# Patient Record
Sex: MALE | Race: WHITE | NOT HISPANIC OR LATINO | Employment: FULL TIME | URBAN - NONMETROPOLITAN AREA
[De-identification: names, ages, dates, MRNs, and addresses within clinical notes are randomized per-mention and may not be internally consistent; named-entity substitution may affect disease eponyms.]

---

## 2023-09-26 PROBLEM — Z79.899 HIGH RISK MEDICATION USE: Status: ACTIVE | Noted: 2023-09-26

## 2023-09-26 PROBLEM — E78.5 HYPERLIPIDEMIA: Status: ACTIVE | Noted: 2023-09-26

## 2023-09-26 PROBLEM — Z98.61 HISTORY OF PTCA: Status: ACTIVE | Noted: 2023-09-26

## 2023-09-26 PROBLEM — Z95.0 PACEMAKER: Status: ACTIVE | Noted: 2023-09-26

## 2023-09-26 PROBLEM — I10 ESSENTIAL HYPERTENSION: Status: ACTIVE | Noted: 2023-09-26

## 2023-09-26 PROBLEM — Z86.79 H/O SICK SINUS SYNDROME: Status: ACTIVE | Noted: 2023-09-26

## 2023-09-26 PROBLEM — I48.19 PERSISTENT ATRIAL FIBRILLATION (MULTI): Status: ACTIVE | Noted: 2023-09-26

## 2023-09-26 PROBLEM — R60.0 LOCALIZED EDEMA: Status: ACTIVE | Noted: 2023-09-26

## 2023-09-26 PROBLEM — I25.10 2-VESSEL CORONARY ARTERY DISEASE: Status: ACTIVE | Noted: 2023-09-26

## 2023-09-26 PROBLEM — I25.2 HISTORY OF MI (MYOCARDIAL INFARCTION): Status: ACTIVE | Noted: 2023-09-26

## 2023-09-26 RX ORDER — SPIRONOLACTONE 25 MG/1
12.5 TABLET ORAL ONCE
COMMUNITY
Start: 2022-06-08 | End: 2024-03-15

## 2023-09-26 RX ORDER — DABIGATRAN ETEXILATE MESYLATE 150 MG/1
1 CAPSULE ORAL 2 TIMES DAILY
COMMUNITY
Start: 2021-03-25 | End: 2024-04-24 | Stop reason: SDUPTHER

## 2023-09-26 RX ORDER — HYDROCHLOROTHIAZIDE 12.5 MG/1
1 TABLET ORAL DAILY
COMMUNITY
Start: 2020-12-09 | End: 2023-12-19

## 2023-09-26 RX ORDER — NITROGLYCERIN 0.4 MG/1
0.4 TABLET SUBLINGUAL EVERY 5 MIN PRN
COMMUNITY

## 2023-09-26 RX ORDER — IRBESARTAN 300 MG/1
1 TABLET ORAL DAILY
COMMUNITY
Start: 2021-08-17

## 2023-09-26 RX ORDER — ATORVASTATIN CALCIUM 10 MG/1
1 TABLET, FILM COATED ORAL NIGHTLY
COMMUNITY
Start: 2021-07-30

## 2023-09-26 RX ORDER — METOPROLOL SUCCINATE 25 MG/1
1 TABLET, EXTENDED RELEASE ORAL DAILY
COMMUNITY
Start: 2021-11-17 | End: 2024-04-10 | Stop reason: SDUPTHER

## 2023-09-26 RX ORDER — MELOXICAM 15 MG/1
1 TABLET ORAL DAILY
COMMUNITY
Start: 2021-03-19

## 2023-12-15 DIAGNOSIS — I10 ESSENTIAL HYPERTENSION: ICD-10-CM

## 2023-12-15 DIAGNOSIS — Z98.61 HISTORY OF PTCA: ICD-10-CM

## 2023-12-15 DIAGNOSIS — I25.10 2-VESSEL CORONARY ARTERY DISEASE: ICD-10-CM

## 2023-12-19 RX ORDER — HYDROCHLOROTHIAZIDE 12.5 MG/1
12.5 TABLET ORAL DAILY
Qty: 90 TABLET | Refills: 3 | Status: SHIPPED | OUTPATIENT
Start: 2023-12-19 | End: 2023-12-28 | Stop reason: SDUPTHER

## 2023-12-28 DIAGNOSIS — I10 ESSENTIAL HYPERTENSION: ICD-10-CM

## 2023-12-28 DIAGNOSIS — I25.10 2-VESSEL CORONARY ARTERY DISEASE: ICD-10-CM

## 2023-12-28 DIAGNOSIS — Z98.61 HISTORY OF PTCA: ICD-10-CM

## 2023-12-29 RX ORDER — HYDROCHLOROTHIAZIDE 12.5 MG/1
12.5 TABLET ORAL DAILY
Qty: 90 TABLET | Refills: 3 | Status: SHIPPED | OUTPATIENT
Start: 2023-12-29 | End: 2024-01-22

## 2024-01-18 DIAGNOSIS — I10 ESSENTIAL HYPERTENSION: ICD-10-CM

## 2024-01-18 DIAGNOSIS — I25.10 2-VESSEL CORONARY ARTERY DISEASE: ICD-10-CM

## 2024-01-18 DIAGNOSIS — Z98.61 HISTORY OF PTCA: ICD-10-CM

## 2024-01-22 RX ORDER — HYDROCHLOROTHIAZIDE 12.5 MG/1
12.5 TABLET ORAL DAILY
Qty: 90 TABLET | Refills: 3 | Status: SHIPPED | OUTPATIENT
Start: 2024-01-22

## 2024-01-29 ENCOUNTER — OFFICE VISIT (OUTPATIENT)
Dept: CARDIOLOGY | Facility: CLINIC | Age: 79
End: 2024-01-29
Payer: MEDICARE

## 2024-01-29 VITALS
SYSTOLIC BLOOD PRESSURE: 128 MMHG | HEIGHT: 66 IN | BODY MASS INDEX: 31.66 KG/M2 | HEART RATE: 66 BPM | DIASTOLIC BLOOD PRESSURE: 68 MMHG | WEIGHT: 197 LBS

## 2024-01-29 DIAGNOSIS — E78.2 MIXED HYPERLIPIDEMIA: ICD-10-CM

## 2024-01-29 DIAGNOSIS — I25.10 2-VESSEL CORONARY ARTERY DISEASE: ICD-10-CM

## 2024-01-29 DIAGNOSIS — I25.2 HISTORY OF MI (MYOCARDIAL INFARCTION): ICD-10-CM

## 2024-01-29 DIAGNOSIS — Z95.0 PACEMAKER: ICD-10-CM

## 2024-01-29 DIAGNOSIS — I48.19 PERSISTENT ATRIAL FIBRILLATION (MULTI): ICD-10-CM

## 2024-01-29 DIAGNOSIS — Z98.61 HISTORY OF PTCA: ICD-10-CM

## 2024-01-29 PROCEDURE — 1160F RVW MEDS BY RX/DR IN RCRD: CPT | Performed by: INTERNAL MEDICINE

## 2024-01-29 PROCEDURE — 3078F DIAST BP <80 MM HG: CPT | Performed by: INTERNAL MEDICINE

## 2024-01-29 PROCEDURE — 1159F MED LIST DOCD IN RCRD: CPT | Performed by: INTERNAL MEDICINE

## 2024-01-29 PROCEDURE — 99213 OFFICE O/P EST LOW 20 MIN: CPT | Performed by: INTERNAL MEDICINE

## 2024-01-29 PROCEDURE — 3074F SYST BP LT 130 MM HG: CPT | Performed by: INTERNAL MEDICINE

## 2024-01-29 PROCEDURE — 1036F TOBACCO NON-USER: CPT | Performed by: INTERNAL MEDICINE

## 2024-01-29 NOTE — PROGRESS NOTES
"Subjective   Jg Hill is a 78 y.o. male       Chief Complaint    Annual Exam          78-year-old gentleman returns for follow-up he is doing well he denies any cardiovascular events, symptoms, nitrate usage or hospitalizations, heart failure, edema or palpitations.    He has a remote history of acute coronary syndrome, two-vessel PCI to the RCA and LAD in 2009, subsequent development of paroxysmal atrial fibrillation/sick sinus syndrome with pacemaker implantation as well as generator replacement in 2019. He has underlying controlled hypertension, mild obesity, remains ambulatory with a Rollator due to significant osteoarthritis. He is hemodynamically stable without evidence lower extremity edema.  He remains on Pradaxa and appropriate secondary preventive therapies    Device report is reviewed, revealing evidence of chronic persistent atrial fibrillation with controlled ventricular response no evidence of tachyarrhythmias or ventricular arrhythmias    Recommendations: Continue current therapies follow-up in 1 year         Review of Systems   All other systems reviewed and are negative.         Visit Vitals  /68 (BP Location: Right arm, Patient Position: Sitting)   Pulse 66   Ht 1.676 m (5' 6\")   Wt 89.4 kg (197 lb)   BMI 31.80 kg/m²   Smoking Status Never   BSA 2.04 m²        Objective   Physical Exam  Constitutional:       Appearance: Normal appearance. He is normal weight.   HENT:      Nose: Nose normal.   Neck:      Vascular: No carotid bruit.   Cardiovascular:      Rate and Rhythm: Normal rate.      Pulses: Normal pulses.      Heart sounds: Normal heart sounds.   Pulmonary:      Effort: Pulmonary effort is normal.   Abdominal:      General: Bowel sounds are normal.      Palpations: Abdomen is soft.   Genitourinary:     Rectum: Normal.   Musculoskeletal:         General: Normal range of motion.      Cervical back: Normal range of motion.      Right lower leg: No edema.      Left lower leg: No edema. "   Skin:     General: Skin is warm and dry.   Neurological:      General: No focal deficit present.      Mental Status: He is alert.   Psychiatric:         Mood and Affect: Mood normal.         Behavior: Behavior normal.         Thought Content: Thought content normal.         Judgment: Judgment normal.         Current Medications    Current Outpatient Medications:     atorvastatin (Lipitor) 10 mg tablet, Take 1 tablet (10 mg) by mouth once daily at bedtime., Disp: , Rfl:     hydroCHLOROthiazide (HYDRODiuril) 12.5 mg tablet, take 1 tablet by mouth once daily, Disp: 90 tablet, Rfl: 3    irbesartan (Avapro) 300 mg tablet, Take 1 tablet (300 mg) by mouth once daily., Disp: , Rfl:     meloxicam (Mobic) 15 mg tablet, Take 1 tablet (15 mg) by mouth once daily. PRN, Disp: , Rfl:     metoprolol succinate XL (Toprol-XL) 25 mg 24 hr tablet, Take 1 tablet (25 mg) by mouth once daily., Disp: , Rfl:     nitroglycerin (Nitrostat) 0.4 mg SL tablet, Place 1 tablet (0.4 mg) under the tongue every 5 minutes if needed for chest pain., Disp: , Rfl:     Pradaxa 150 mg capsule, Take 1 capsule (150 mg) by mouth 2 times a day., Disp: , Rfl:     spironolactone (Aldactone) 25 mg tablet, Take 0.5 tablets (12.5 mg) by mouth 1 time., Disp: , Rfl:                  Scribe Attestation  By signing my name below, Jenn CONNER LPN   , [employer]   attest that this documentation has been prepared under the direction and in the presence of Shady Art DO.     Assessment/Plan   1. High risk medication use        2. 2-vessel coronary artery disease        3. History of MI (myocardial infarction)        4. Pacemaker        5. History of PTCA        6. Mixed hyperlipidemia        7. Persistent atrial fibrillation (CMS/HCC)

## 2024-02-08 LAB
NON-UH HIE ALANINE AMINOTRANSFERASE: 16 U/L (ref 7–52)
NON-UH HIE ANION GAP: 10.8 (ref 6–15)
NON-UH HIE ASPARTATE AMINO TRANSFERASE: 16 U/L (ref 13–39)
NON-UH HIE BLOOD UREA NITROGEN: 21 MG/DL (ref 7–25)
NON-UH HIE CALCIUM: 10.6 MG/DL (ref 8.6–10.3)
NON-UH HIE CARBON DIOXIDE: 32.5 MMOL/L (ref 21–31)
NON-UH HIE CHLORIDE: 102 MMOL/L (ref 98–107)
NON-UH HIE CHOL/HDL RATIO: 2.8
NON-UH HIE CHOLESTEROL: 146 MG/DL (ref 140–200)
NON-UH HIE CREATININE: 0.91 MG/DL (ref 0.7–1.3)
NON-UH HIE ESTIMATED GFR: > 60
NON-UH HIE GLUCOSE: 96 MG/DL (ref 70–100)
NON-UH HIE HDL CHOLESTEROL: 53 MG/DL (ref 23–92)
NON-UH HIE LDL CHOLESTEROL,CALCULATED: 74 MG/DL (ref 0–100)
NON-UH HIE POTASSIUM: 4.3 MMOL/L (ref 3.5–5.1)
NON-UH HIE SODIUM: 141 MMOL/L (ref 136–145)
NON-UH HIE TRIGLYCERIDE W/REFLEX: 97 MG/DL (ref 0–149)
NON-UH HIE VLDL CHOLESTEROL: 19 MG/DL

## 2024-02-14 ENCOUNTER — TELEPHONE (OUTPATIENT)
Dept: CARDIOLOGY | Facility: CLINIC | Age: 79
End: 2024-02-14
Payer: MEDICARE

## 2024-03-14 DIAGNOSIS — I25.2 HISTORY OF MI (MYOCARDIAL INFARCTION): ICD-10-CM

## 2024-03-14 DIAGNOSIS — I10 ESSENTIAL HYPERTENSION: ICD-10-CM

## 2024-03-15 RX ORDER — SPIRONOLACTONE 25 MG/1
TABLET ORAL DAILY
Qty: 45 TABLET | Refills: 3 | Status: SHIPPED | OUTPATIENT
Start: 2024-03-15

## 2024-03-28 LAB
NON-UH HIE ALANINE AMINOTRANSFERASE: 14 U/L (ref 7–52)
NON-UH HIE ANION GAP: 9.8 (ref 6–15)
NON-UH HIE ASPARTATE AMINO TRANSFERASE: 15 U/L (ref 13–39)
NON-UH HIE BLOOD UREA NITROGEN: 21 MG/DL (ref 7–25)
NON-UH HIE CALCIUM: 10.2 MG/DL (ref 8.6–10.3)
NON-UH HIE CARBON DIOXIDE: 31.3 MMOL/L (ref 21–31)
NON-UH HIE CHLORIDE: 104 MMOL/L (ref 98–107)
NON-UH HIE CHOL/HDL RATIO: 2.7
NON-UH HIE CHOLESTEROL: 138 MG/DL (ref 140–200)
NON-UH HIE CREATININE: 0.94 MG/DL (ref 0.7–1.3)
NON-UH HIE ESTIMATED GFR: > 60
NON-UH HIE GLUCOSE: 110 MG/DL (ref 70–100)
NON-UH HIE HDL CHOLESTEROL: 51 MG/DL (ref 23–92)
NON-UH HIE LDL CHOLESTEROL,CALCULATED: 72 MG/DL (ref 0–100)
NON-UH HIE POTASSIUM: 4.1 MMOL/L (ref 3.5–5.1)
NON-UH HIE SODIUM: 141 MMOL/L (ref 136–145)
NON-UH HIE TRIGLYCERIDE W/REFLEX: 77 MG/DL (ref 0–149)
NON-UH HIE VLDL CHOLESTEROL: 15 MG/DL

## 2024-04-10 DIAGNOSIS — I25.10 2-VESSEL CORONARY ARTERY DISEASE: ICD-10-CM

## 2024-04-10 DIAGNOSIS — I48.19 PERSISTENT ATRIAL FIBRILLATION (MULTI): ICD-10-CM

## 2024-04-10 DIAGNOSIS — I10 ESSENTIAL HYPERTENSION: ICD-10-CM

## 2024-04-10 RX ORDER — METOPROLOL SUCCINATE 25 MG/1
25 TABLET, EXTENDED RELEASE ORAL DAILY
Qty: 90 TABLET | Refills: 3 | Status: SHIPPED | OUTPATIENT
Start: 2024-04-10 | End: 2025-04-10

## 2024-04-24 DIAGNOSIS — I48.19 PERSISTENT ATRIAL FIBRILLATION (MULTI): ICD-10-CM

## 2024-04-24 DIAGNOSIS — Z98.61 HISTORY OF PTCA: ICD-10-CM

## 2024-04-24 DIAGNOSIS — I25.10 2-VESSEL CORONARY ARTERY DISEASE: ICD-10-CM

## 2024-04-29 RX ORDER — DABIGATRAN ETEXILATE 150 MG/1
150 CAPSULE ORAL 2 TIMES DAILY
Qty: 180 CAPSULE | Refills: 3 | Status: SHIPPED | OUTPATIENT
Start: 2024-04-29

## 2024-06-13 DIAGNOSIS — I25.2 HISTORY OF MI (MYOCARDIAL INFARCTION): ICD-10-CM

## 2024-06-13 DIAGNOSIS — I10 ESSENTIAL HYPERTENSION: ICD-10-CM

## 2024-06-14 RX ORDER — SPIRONOLACTONE 25 MG/1
12.5 TABLET ORAL DAILY
Qty: 45 TABLET | Refills: 3 | Status: SHIPPED | OUTPATIENT
Start: 2024-06-14 | End: 2025-06-14

## 2024-06-27 ENCOUNTER — TELEPHONE (OUTPATIENT)
Dept: CARDIOLOGY | Facility: CLINIC | Age: 79
End: 2024-06-27
Payer: MEDICARE

## 2024-07-18 PROCEDURE — 93280 PM DEVICE PROGR EVAL DUAL: CPT | Performed by: INTERNAL MEDICINE

## 2024-10-02 DIAGNOSIS — E78.5 HYPERLIPIDEMIA, UNSPECIFIED HYPERLIPIDEMIA TYPE: ICD-10-CM

## 2024-10-07 RX ORDER — ATORVASTATIN CALCIUM 10 MG/1
10 TABLET, FILM COATED ORAL NIGHTLY
Qty: 90 TABLET | Refills: 3 | Status: SHIPPED | OUTPATIENT
Start: 2024-10-07 | End: 2025-10-07

## 2024-12-17 ENCOUNTER — TELEPHONE (OUTPATIENT)
Dept: CARDIOLOGY | Facility: CLINIC | Age: 79
End: 2024-12-17
Payer: MEDICARE

## 2024-12-17 NOTE — TELEPHONE ENCOUNTER
Wife phones in with concerns related to patient's mediations.     She states she went to refill spironolactone and pharmacist asked her if patient is taking potassium, he is not currently on a potassium supplement. Pt had a fall and was in [facility] last nights, 12/16, and his potassium level came back at 3.     Wife is wondering if patient needs to be taking a potassium supplement. Please advise     Will need sent to [facility] in [place] if so.

## 2024-12-17 NOTE — TELEPHONE ENCOUNTER
Spoke with patient's spouse and informed her that spironolactone does not cause potassium to decrease, rather it spares potassium. Informed her Dr. Shady Art, DO states patient could consider stopping hydrochlorothiazide. She verbalized understanding and states she is going to have patient stop hydrochlorothiazide.

## 2025-02-04 ENCOUNTER — APPOINTMENT (OUTPATIENT)
Dept: CARDIOLOGY | Facility: CLINIC | Age: 80
End: 2025-02-04
Payer: MEDICARE

## 2025-02-04 VITALS
HEIGHT: 66 IN | WEIGHT: 190 LBS | HEART RATE: 56 BPM | BODY MASS INDEX: 30.53 KG/M2 | DIASTOLIC BLOOD PRESSURE: 60 MMHG | SYSTOLIC BLOOD PRESSURE: 106 MMHG

## 2025-02-04 DIAGNOSIS — Z95.0 PACEMAKER: ICD-10-CM

## 2025-02-04 DIAGNOSIS — E78.5 HYPERLIPIDEMIA, UNSPECIFIED HYPERLIPIDEMIA TYPE: ICD-10-CM

## 2025-02-04 DIAGNOSIS — I48.19 PERSISTENT ATRIAL FIBRILLATION (MULTI): ICD-10-CM

## 2025-02-04 DIAGNOSIS — I65.23 CAROTID ARTERY PLAQUE, BILATERAL: ICD-10-CM

## 2025-02-04 DIAGNOSIS — I25.10 ASHD (ARTERIOSCLEROTIC HEART DISEASE): ICD-10-CM

## 2025-02-04 DIAGNOSIS — I10 ESSENTIAL HYPERTENSION: ICD-10-CM

## 2025-02-04 DIAGNOSIS — Z86.79 H/O SICK SINUS SYNDROME: ICD-10-CM

## 2025-02-04 DIAGNOSIS — R93.89 ABNORMAL CT SCAN: ICD-10-CM

## 2025-02-04 DIAGNOSIS — Z98.61 HISTORY OF PTCA: ICD-10-CM

## 2025-02-04 DIAGNOSIS — Z87.891 FORMER SMOKER: ICD-10-CM

## 2025-02-04 PROCEDURE — 99214 OFFICE O/P EST MOD 30 MIN: CPT | Performed by: INTERNAL MEDICINE

## 2025-02-04 PROCEDURE — 3074F SYST BP LT 130 MM HG: CPT | Performed by: INTERNAL MEDICINE

## 2025-02-04 PROCEDURE — 1160F RVW MEDS BY RX/DR IN RCRD: CPT | Performed by: INTERNAL MEDICINE

## 2025-02-04 PROCEDURE — 3078F DIAST BP <80 MM HG: CPT | Performed by: INTERNAL MEDICINE

## 2025-02-04 PROCEDURE — 1036F TOBACCO NON-USER: CPT | Performed by: INTERNAL MEDICINE

## 2025-02-04 PROCEDURE — 1159F MED LIST DOCD IN RCRD: CPT | Performed by: INTERNAL MEDICINE

## 2025-02-04 RX ORDER — IRBESARTAN 150 MG/1
150 TABLET ORAL NIGHTLY
Qty: 90 TABLET | Refills: 3 | Status: SHIPPED | OUTPATIENT
Start: 2025-02-04 | End: 2026-02-04

## 2025-02-04 RX ORDER — DABIGATRAN ETEXILATE 150 MG/1
150 CAPSULE ORAL 2 TIMES DAILY
Qty: 180 CAPSULE | Refills: 3 | Status: SHIPPED | OUTPATIENT
Start: 2025-02-04

## 2025-02-04 RX ORDER — SPIRONOLACTONE 25 MG/1
12.5 TABLET ORAL DAILY
Qty: 45 TABLET | Refills: 3 | Status: SHIPPED | OUTPATIENT
Start: 2025-02-04 | End: 2026-02-04

## 2025-02-04 RX ORDER — HYDROCHLOROTHIAZIDE 12.5 MG/1
12.5 TABLET ORAL
COMMUNITY
Start: 2024-01-22 | End: 2025-02-04 | Stop reason: SDUPTHER

## 2025-02-04 RX ORDER — HYDROCHLOROTHIAZIDE 12.5 MG/1
12.5 TABLET ORAL
Qty: 90 TABLET | Refills: 3 | Status: SHIPPED | OUTPATIENT
Start: 2025-02-04 | End: 2026-02-04

## 2025-02-04 RX ORDER — METOPROLOL SUCCINATE 25 MG/1
25 TABLET, EXTENDED RELEASE ORAL DAILY
Qty: 90 TABLET | Refills: 3 | Status: SHIPPED | OUTPATIENT
Start: 2025-02-04 | End: 2026-02-04

## 2025-02-04 RX ORDER — ATORVASTATIN CALCIUM 10 MG/1
10 TABLET, FILM COATED ORAL NIGHTLY
Qty: 90 TABLET | Refills: 3 | Status: SHIPPED | OUTPATIENT
Start: 2025-02-04 | End: 2026-02-04

## 2025-02-04 NOTE — PROGRESS NOTES
"Subjective   Jg Hill is a 79 y.o. male       Chief Complaint    Annual Exam          79-year-old gentleman returns for annual follow-up he is doing well he denies any cardiovascular events, complaints or nitrate usage or cardiovascular hospitalizations, heart failure, edema or true syncope.  He did fall (mechanical fall) this past December 17, sustaining laceration to the occiput with bleeding that was stapled, with no residual problems or sequela or neurologic issues.    On CT imaging of the head and brain there is no intracranial trauma; there is mention of carotid bifurcation calcium and plaque (no mention of stenosis), and transverse aorta was normal caliber without aneurysm.  Clinically, he denies any neurologic events he has never had any carotid ultrasound imaging for surveillance.  He remains on Pradaxa and treatment for thromboembolic prophylaxis for paroxysmal A-fib.  He did have stress perfusion imaging in 2019, 10 years after his original revascularization that was completely normal    He has a remote history of acute coronary syndrome, two-vessel PCI to the RCA and LAD in 2009, subsequent development of paroxysmal atrial fibrillation/sick sinus syndrome with pacemaker implantation as well as generator replacement in 2019. He has underlying controlled hypertension, mild obesity, remains ambulatory with a Rollator due to significant osteoarthritis.    Based on the above I would continue to recommend continue current therapies, counseling on fall prevention, will proceed with elective carotid duplex imaging based on the recent CT results simply to gain a baseline for his carotid arteries as he has never had any surveillance         Review of Systems   All other systems reviewed and are negative.           Vitals:    02/04/25 1326   BP: 106/60   BP Location: Right arm   Patient Position: Sitting   Pulse: 56   Weight: 86.2 kg (190 lb)   Height: 1.676 m (5' 6\")        Objective   Physical " Exam  Constitutional:       Appearance: Normal appearance.   HENT:      Nose: Nose normal.   Neck:      Vascular: No carotid bruit.   Cardiovascular:      Rate and Rhythm: Normal rate.      Pulses: Normal pulses.      Heart sounds: Normal heart sounds.   Pulmonary:      Effort: Pulmonary effort is normal.   Abdominal:      General: Bowel sounds are normal.      Palpations: Abdomen is soft.   Musculoskeletal:         General: Normal range of motion.      Cervical back: Normal range of motion.      Right lower leg: No edema.      Left lower leg: No edema.   Skin:     General: Skin is warm and dry.   Neurological:      General: No focal deficit present.      Mental Status: He is alert.   Psychiatric:         Mood and Affect: Mood normal.         Behavior: Behavior normal.         Thought Content: Thought content normal.         Judgment: Judgment normal.         Allergies  Hydrocodone-acetaminophen and Tramadol     Current Medications    Current Outpatient Medications:     atorvastatin (Lipitor) 10 mg tablet, Take 1 tablet (10 mg) by mouth once daily at bedtime., Disp: 90 tablet, Rfl: 3    dabigatran etexilate (Pradaxa) 150 mg capsule, Take 1 capsule (150 mg) by mouth 2 times a day., Disp: 180 capsule, Rfl: 3    hydroCHLOROthiazide (Microzide) 12.5 mg tablet, Take 1 tablet (12.5 mg) by mouth once daily., Disp: , Rfl:     irbesartan (Avapro) 150 mg tablet, Take 1 tablet (150 mg) by mouth once daily at bedtime., Disp: 90 tablet, Rfl: 3    meloxicam (Mobic) 15 mg tablet, Take 1 tablet (15 mg) by mouth once daily. PRN, Disp: , Rfl:     metoprolol succinate XL (Toprol-XL) 25 mg 24 hr tablet, Take 1 tablet (25 mg) by mouth once daily., Disp: 90 tablet, Rfl: 3    nitroglycerin (Nitrostat) 0.4 mg SL tablet, Place 1 tablet (0.4 mg) under the tongue every 5 minutes if needed for chest pain., Disp: , Rfl:     spironolactone (Aldactone) 25 mg tablet, Take 0.5 tablets (12.5 mg) by mouth once daily., Disp: 45 tablet, Rfl: 3                      Assessment/Plan   1. ASHD (arteriosclerotic heart disease)        2. History of PTCA  Follow Up In Cardiology      3. Persistent atrial fibrillation (Multi)        4. H/O sick sinus syndrome        5. Abnormal CT scan        6. Pacemaker  Follow Up In Cardiology      7. Essential hypertension        8. Hyperlipidemia, unspecified hyperlipidemia type        9. BMI 30.0-30.9,adult        10. Former smoker                 Scribe Attestation  By signing my name below, ICatrachita LPN  , Scribe   attest that this documentation has been prepared under the direction and in the presence of Shady Art DO.     Provider Attestation - Scribe documentation    All medical record entries made by the Scribe were at my direction and personally dictated by me. I have reviewed the chart and agree that the record accurately reflects my personal performance of the history, physical exam, discussion and plan.

## 2025-02-04 NOTE — LETTER
February 4, 2025     Randal Glez MD  [address] [facility] [address]    Patient: Jg Hill   YOB: 1945   Date of Visit: 2/4/2025       Dear Dr. Randal Glez MD:    Thank you for referring Jg Hill to me for evaluation. Below are my notes for this consultation.  If you have questions, please do not hesitate to call me. I look forward to following your patient along with you.       Sincerely,     Shady Art, DO      CC: No Recipients  ______________________________________________________________________________________    Subjective   Jg Hill is a 79 y.o. male       Chief Complaint    Annual Exam          79-year-old gentleman returns for annual follow-up he is doing well he denies any cardiovascular events, complaints or nitrate usage or cardiovascular hospitalizations, heart failure, edema or true syncope.  He did fall (mechanical fall) this past December 17, sustaining laceration to the occiput with bleeding that was stapled, with no residual problems or sequela or neurologic issues.    On CT imaging of the head and brain there is no intracranial trauma; there is mention of carotid bifurcation calcium and plaque (no mention of stenosis), and transverse aorta was normal caliber without aneurysm.  Clinically, he denies any neurologic events he has never had any carotid ultrasound imaging for surveillance.  He remains on Pradaxa and treatment for thromboembolic prophylaxis for paroxysmal A-fib.  He did have stress perfusion imaging in 2019, 10 years after his original revascularization that was completely normal    He has a remote history of acute coronary syndrome, two-vessel PCI to the RCA and LAD in 2009, subsequent development of paroxysmal atrial fibrillation/sick sinus syndrome with pacemaker implantation as well as generator replacement in 2019. He has underlying controlled hypertension, mild obesity, remains ambulatory with  "a Rollator due to significant osteoarthritis.    Based on the above I would continue to recommend continue current therapies, counseling on fall prevention, will proceed with elective carotid duplex imaging based on the recent CT results simply to gain a baseline for his carotid arteries as he has never had any surveillance         Review of Systems   All other systems reviewed and are negative.           Vitals:    02/04/25 1326   BP: 106/60   BP Location: Right arm   Patient Position: Sitting   Pulse: 56   Weight: 86.2 kg (190 lb)   Height: 1.676 m (5' 6\")        Objective   Physical Exam  Constitutional:       Appearance: Normal appearance.   HENT:      Nose: Nose normal.   Neck:      Vascular: No carotid bruit.   Cardiovascular:      Rate and Rhythm: Normal rate.      Pulses: Normal pulses.      Heart sounds: Normal heart sounds.   Pulmonary:      Effort: Pulmonary effort is normal.   Abdominal:      General: Bowel sounds are normal.      Palpations: Abdomen is soft.   Musculoskeletal:         General: Normal range of motion.      Cervical back: Normal range of motion.      Right lower leg: No edema.      Left lower leg: No edema.   Skin:     General: Skin is warm and dry.   Neurological:      General: No focal deficit present.      Mental Status: He is alert.   Psychiatric:         Mood and Affect: Mood normal.         Behavior: Behavior normal.         Thought Content: Thought content normal.         Judgment: Judgment normal.         Allergies  Hydrocodone-acetaminophen and Tramadol     Current Medications    Current Outpatient Medications:   •  atorvastatin (Lipitor) 10 mg tablet, Take 1 tablet (10 mg) by mouth once daily at bedtime., Disp: 90 tablet, Rfl: 3  •  dabigatran etexilate (Pradaxa) 150 mg capsule, Take 1 capsule (150 mg) by mouth 2 times a day., Disp: 180 capsule, Rfl: 3  •  hydroCHLOROthiazide (Microzide) 12.5 mg tablet, Take 1 tablet (12.5 mg) by mouth once daily., Disp: , Rfl:   •  irbesartan " (Avapro) 150 mg tablet, Take 1 tablet (150 mg) by mouth once daily at bedtime., Disp: 90 tablet, Rfl: 3  •  meloxicam (Mobic) 15 mg tablet, Take 1 tablet (15 mg) by mouth once daily. PRN, Disp: , Rfl:   •  metoprolol succinate XL (Toprol-XL) 25 mg 24 hr tablet, Take 1 tablet (25 mg) by mouth once daily., Disp: 90 tablet, Rfl: 3  •  nitroglycerin (Nitrostat) 0.4 mg SL tablet, Place 1 tablet (0.4 mg) under the tongue every 5 minutes if needed for chest pain., Disp: , Rfl:   •  spironolactone (Aldactone) 25 mg tablet, Take 0.5 tablets (12.5 mg) by mouth once daily., Disp: 45 tablet, Rfl: 3                     Assessment/Plan   1. ASHD (arteriosclerotic heart disease)        2. History of PTCA  Follow Up In Cardiology      3. Persistent atrial fibrillation (Multi)        4. H/O sick sinus syndrome        5. Abnormal CT scan        6. Pacemaker  Follow Up In Cardiology      7. Essential hypertension        8. Hyperlipidemia, unspecified hyperlipidemia type        9. BMI 30.0-30.9,adult        10. Former smoker                 Scribe Attestation  By signing my name below, ICatrachita LPN, Scribe   attest that this documentation has been prepared under the direction and in the presence of Shady Art DO.     Provider Attestation - Scribe documentation    All medical record entries made by the Scribe were at my direction and personally dictated by me. I have reviewed the chart and agree that the record accurately reflects my personal performance of the history, physical exam, discussion and plan.

## 2025-02-04 NOTE — PATIENT INSTRUCTIONS
Please bring all medicines, vitamins, and herbal supplements with you when you come to the office.    Prescriptions will not be filled unless you are compliant with your follow up appointments or have a follow up appointment scheduled as per instruction of your physician. Refills should be requested at the time of your visit.     Fall Prevention Education Given   BMI was above normal measurement. Current weight: 86.2 kg (190 lb)  Weight change since last visit (-) denotes wt loss -7 lbs   Weight loss needed to achieve BMI 25: 35.4 Lbs  Weight loss needed to achieve BMI 30: 4.5 Lbs  Provided instructions on dietary changes  Provided instructions on exercise.

## 2025-03-31 ENCOUNTER — HOSPITAL ENCOUNTER (OUTPATIENT)
Dept: CARDIOLOGY | Facility: CLINIC | Age: 80
Discharge: HOME | End: 2025-03-31
Payer: MEDICARE

## 2025-03-31 DIAGNOSIS — I65.23 CAROTID ARTERY PLAQUE, BILATERAL: ICD-10-CM

## 2025-03-31 DIAGNOSIS — Z98.61 HISTORY OF PTCA: ICD-10-CM

## 2025-03-31 DIAGNOSIS — I25.10 ASHD (ARTERIOSCLEROTIC HEART DISEASE): ICD-10-CM

## 2025-03-31 DIAGNOSIS — R93.89 ABNORMAL CT SCAN: ICD-10-CM

## 2025-03-31 PROCEDURE — 93880 EXTRACRANIAL BILAT STUDY: CPT | Performed by: INTERNAL MEDICINE

## 2025-03-31 PROCEDURE — 93880 EXTRACRANIAL BILAT STUDY: CPT

## 2025-04-02 ENCOUNTER — TELEPHONE (OUTPATIENT)
Dept: CARDIOLOGY | Facility: CLINIC | Age: 80
End: 2025-04-02
Payer: MEDICARE

## 2025-04-04 ENCOUNTER — TELEPHONE (OUTPATIENT)
Dept: CARDIOLOGY | Facility: CLINIC | Age: 80
End: 2025-04-04
Payer: MEDICARE

## 2025-04-04 NOTE — TELEPHONE ENCOUNTER
Result Communication    Resulted Orders   Vascular US Carotid Artery Duplex Bilateral    Narrative                   [facility]  [address], [address], [place] [address]          Tel [phone] Fax [phone]       Vascular Lab Report     [facility] US CAROTID ARTERY DUPLEX BILATERAL    Patient Name:      MONA L[address] TORRES Medina Physician:  [id number] Christi Velasquez MD, [facility]  Study Date:        3/31/2025            Ordering Provider:  [id number] JACEK POLO  MRN/PID:           [id number]             Fellow:  Accession#:        [id number]         Technologist:       Nicole Calvert [facility],                                                              T  Date of Birth/Age: 1945 / 79 years Technologist 2:  Gender:            M                    Encounter#:         [id number]  Admission Status:  Outpatient           Location Performed: [facility]       Diagnosis/ICD: 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 Laminar flow seen by color Doppler. Right external carotid artery appears patent with no evidence of stenosis. No evidence of hemodynamically significant stenosis of the right common carotid artery. The right vertebral artery is patent with antegrade flow. No changes since 2019.  Left Carotid: Findings are consistent with less than 50% stenosis of the left proximal internal carotid artery. Laminar flow seen by color Doppler. Left external carotid artery appears patent with no evidence of stenosis. No  evidence of hemodynamically significant stenosis of the left common carotid artery. The left vertebral artery is patent with antegrade flow. No changes since 2019.     Imaging & Doppler Findings:  Right Plaque Morph: The proximal right internal carotid artery demonstrates irregular and calcified plaque. The mid right internal carotid artery demonstrates irregular and calcified plaque. The proximal right external carotid artery demonstrates irregular and calcified plaque. The distal right common carotid artery demonstrates irregular plaque.  Left Plaque Morph: The proximal left internal carotid artery demonstrates irregular plaque. The mid left internal carotid artery demonstrates irregular plaque. The proximal left external carotid artery demonstrates irregular plaque. The distal left common carotid artery demonstrates irregular plaque.      Right                      Left    PSV      EDV               PSV     EDV  63 cm/s  13 cm/s   CCA P   84 cm/s 20 cm/s  72 cm/s  19 cm/s   CCA M   64 cm/s 16 cm/s  67 cm/s  17 cm/s   CCA D   60 cm/s 11 cm/s  35 cm/s  14 cm/s   ICA P   76 cm/s 24 cm/s  54 cm/s  18 cm/s   ICA M   81 cm/s 31 cm/s  72 cm/s  24 cm/s   ICA D   67 cm/s 22 cm/s  109 cm/s 95 cm/s    ECA    67 cm/s 51 cm/s  45 cm/s  31 cm/s Vertebral 55 cm/s 39 cm/s                     Right Left  ICA/CCA Ratio  0.5  1.3          [id number] Christi Velasquez MD, FACC  Electronically signed by [id number] Christi Velasquez MD, FACC on 4/2/2025 at 5:15:34 PM         ** Final **

## 2025-04-04 NOTE — TELEPHONE ENCOUNTER
----- Message from Shady Art sent at 4/3/2025  5:25 PM EDT -----  No new orders. Please call patient with normal result.

## 2025-05-14 ENCOUNTER — TELEPHONE (OUTPATIENT)
Dept: CARDIOLOGY | Facility: CLINIC | Age: 80
End: 2025-05-14
Payer: MEDICARE

## 2025-05-14 DIAGNOSIS — R60.9 EDEMA, UNSPECIFIED TYPE: ICD-10-CM

## 2025-05-14 NOTE — TELEPHONE ENCOUNTER
Wife called to report narciso lower extremity edema, left greater than right.  Edema is from ankle to knee area. Denies redness or warmth. Denies fever. Denies any more discomfort than normal. Wife gave an extra hydrochlorothiazide and mild improvement noted.  States edema increased as day passes. Onset-2 weeks.     Wife states patient normally has intermittent edema but in the past 2 weeks then edema has not resolved. Patient is able to wear shoes.  Inquired if hydrochlorothiazide can be increased?   Patient does have hx of knee injury left side.     Patient is taking Praxdaxa.

## 2025-05-15 RX ORDER — FUROSEMIDE 20 MG/1
20 TABLET ORAL AS NEEDED
Qty: 15 TABLET | Refills: 0 | Status: SHIPPED | OUTPATIENT
Start: 2025-05-15

## 2025-05-15 NOTE — TELEPHONE ENCOUNTER
Patient went to ER last night. Concerned was a possible DVT and the amount of edema. States testing was neg and was dx with chronic edema.     Recommendations given.  Would like rx for lasix. To Beatriz Macdonald NP to sign

## 2025-05-16 NOTE — TELEPHONE ENCOUNTER
Patient's spouse phoned left message on nursing line inquiring if pt would need potassium with extra Lasix prescribed. Attempted  to phone, detailed message left. TO [facility] for follow up.    Patient phoned orders discussed Lasix PRN, will follow up as scheduled. Verbalized understanding.

## 2025-05-21 ENCOUNTER — APPOINTMENT (OUTPATIENT)
Dept: CARDIOLOGY | Facility: CLINIC | Age: 80
End: 2025-05-21
Payer: MEDICARE

## 2025-05-21 VITALS
HEART RATE: 56 BPM | SYSTOLIC BLOOD PRESSURE: 120 MMHG | WEIGHT: 201 LBS | HEIGHT: 66 IN | BODY MASS INDEX: 32.3 KG/M2 | DIASTOLIC BLOOD PRESSURE: 70 MMHG

## 2025-05-21 DIAGNOSIS — R60.0 LOCALIZED EDEMA: Primary | ICD-10-CM

## 2025-05-21 PROBLEM — I48.19 PERSISTENT ATRIAL FIBRILLATION (MULTI): Status: RESOLVED | Noted: 2023-09-26 | Resolved: 2025-05-21

## 2025-05-21 PROCEDURE — 1159F MED LIST DOCD IN RCRD: CPT | Performed by: NURSE PRACTITIONER

## 2025-05-21 PROCEDURE — 99214 OFFICE O/P EST MOD 30 MIN: CPT | Performed by: NURSE PRACTITIONER

## 2025-05-21 PROCEDURE — 1160F RVW MEDS BY RX/DR IN RCRD: CPT | Performed by: NURSE PRACTITIONER

## 2025-05-21 PROCEDURE — 3074F SYST BP LT 130 MM HG: CPT | Performed by: NURSE PRACTITIONER

## 2025-05-21 PROCEDURE — 3078F DIAST BP <80 MM HG: CPT | Performed by: NURSE PRACTITIONER

## 2025-05-21 NOTE — LETTER
"May 22, 2025     Randal Glez MD  [address] [facility] [address]    Patient: Jg Hill   YOB: 1945   Date of Visit: 5/21/2025       Dear Dr. Randal Glez MD:    Thank you for referring Jg Hill to me for evaluation. Below are my notes for this consultation.  If you have questions, please do not hesitate to call me. I look forward to following your patient along with you.       Sincerely,     Beatriz Bashir, APRN-CNP      CC: No Recipients  ______________________________________________________________________________________    Chief Complaint  \"My legs have been swelling\"    Reason for Visit  Add-on  Patient presents to the office today for outpatient follow-up for edema.  Last evaluated in clinic by Dr. Art Feb 2025.    Presents today ambulatory with steady gait.  Accompanied by spouse  Patient denies any hospitalizations or significant changes to interval medical history since last office follow-up.     History of Present Illness   Patient wife did phone into the office reporting increasing left greater than right edema.  He was seen in local emergency department where he ruled out for DVT, Keflex was added due to right lower extremity with early signs of cellulitis from an open wound.  I have then added Lasix 20 mg as needed and they have taken 2 doses with noted benefit.    Patient reports somewhat chronic left edema likely due to venous stasis changes.  He states that things have really been okay over the last 6 months but seem to have\" flared up over the last couple weeks\".  He did have an injury to his right shin with an open area that is scabbed over, there are some slight erythema but they report with Keflex treatment things are getting better.  He remains with 1+ Tolin edema on the right, that left ankle looks to have some chronic venous stasis nonpitting changes.  He denies any orthopnea, there is no PND.  He sleeps in recliner " "for comfort.  He does not elevate his legs during the day.  This is his busy season at the [employer] and he is working 7 days a week.  His weight in the office is up 11 pounds he was wearing his jacket, he believes this is an accurate and reports his weight is steady on his home scale.    Potassium 3.9, creatinine 0.93.    Patient reports that overall has no complaint(s) of chest pain, chest pressure/discomfort, claudication, dyspnea, exertional chest pressure/discomfort, fatigue, irregular heart beat, and near-syncope    Review of Systems   Cardiovascular:  Positive for leg swelling. Negative for chest pain, dyspnea on exertion, irregular heartbeat, near-syncope, orthopnea, palpitations, paroxysmal nocturnal dyspnea and syncope.        Visit Vitals  /70 (BP Location: Left arm, Patient Position: Sitting)   Pulse 56   Ht 1.676 m (5' 6\")   Wt 91.2 kg (201 lb)   BMI 32.44 kg/m²   Smoking Status Never   BSA 2.06 m²     Physical Exam  Vitals and nursing note reviewed.   Constitutional:       Appearance: Normal appearance.   Cardiovascular:      Rate and Rhythm: Normal rate and regular rhythm.      Heart sounds: Murmur heard.      Systolic murmur is present with a grade of 2/6.      Comments: Left ankle nonpitting edema chronic venous stasis  Right shin with scabbed over open area, no drainage.  Mild erythema.  1+ edema.  Currently on Keflex.  Pulmonary:      Effort: Pulmonary effort is normal.      Breath sounds: Normal breath sounds.   Musculoskeletal:      Cervical back: Full passive range of motion without pain.      Right lower leg: No edema.      Left lower leg: No edema.   Skin:     General: Skin is cool.   Neurological:      Mental Status: He is alert and oriented to person, place, and time.   Psychiatric:         Attention and Perception: Attention normal.         Mood and Affect: Mood normal.         Behavior: Behavior is cooperative.          ALLERGIES:  Hydrocodone-acetaminophen and Tramadol    Current " Outpatient Medications   Medication Instructions   • atorvastatin (LIPITOR) 10 mg, oral, Nightly   • dabigatran etexilate (PRADAXA) 150 mg, oral, 2 times daily   • furosemide (LASIX) 20 mg, oral, As needed   • hydroCHLOROthiazide (MICROZIDE) 12.5 mg, oral, Daily RT   • irbesartan (AVAPRO) 150 mg, oral, Nightly   • meloxicam (Mobic) 15 mg tablet 1 tablet, Daily   • metoprolol succinate XL (TOPROL-XL) 25 mg, oral, Daily   • nitroglycerin (NITROSTAT) 0.4 mg, Every 5 min PRN   • spironolactone (ALDACTONE) 12.5 mg, oral, Daily      Assessment:    79-year-old gentleman presents to the office due to concerns of progressive bilateral lower extremity edema.  Left ankle seems to be chronic, right lower extremity edema is likely due to recent injury and open wound, improving cellulitis.  Will continue Keflex and has been encouraged to update with PCP.  He is on no offending agents.  Encouraged to keep legs elevated is much as possible.  Otherwise, we will continue Lasix on an as-needed basis.  Discussed that only concern would be with progression to chronic atrial fibrillation with high percent of ventricular pacing there could be some evidence of cardiomyopathy.  Last TTE in 2019 and at that time he was in normal sinus rhythm.  He does agree to repeat echocardiogram for surveillance and to clinic for optimization of guideline directed medical therapy of any evidence of cardiomyopathy.    From a cardiovascular history:  Remote 2009 PCI, last ischemic evaluation 2019 MPI.  Current daily activity greater than 4 METS without concerning symptoms.  2019 TTE LVEF 55 to 60%, LVH mild, left atrium moderate.  MR trace.  [facility] 2278 dual-chamber permanent pacemaker.  Chronic atrial fibrillation with January 2025 device interrogation showing V pacing 87%.  CHADS VASc 6 chronically anticoagulated Pradaxa.  Denies bleeding diatheses.    Plan:     Through informed decision making process incorporating patients unique  circumstances, the following treatment plan will be initiated:    1.  Prescription drug management of cardiovascular medication for efficacy, adherence to treatment, side effect assessment and polypharmacy. Current treatment clinically warranted and to continue without modifications.    2.  Echocardiogram (edema, atrial fib/ vpaced 85%)    3. Return for follow-up; in the interim, contact the office if new symptoms arise.  Dr. Art as scheduled unless abnormal testing  I will call in 2 weeks to make sure wound is healing    Beatriz Bashir MSN, APRN-CNP, PMHNP-[facility] [place]  [place], [place]     Please excuse any errors in grammar or translation related to this dictation. Voice recognition software was utilized to prepare this document.

## 2025-05-21 NOTE — PATIENT INSTRUCTIONS
Please bring all medicines, vitamins, and herbal supplements with you when you come to the office.    Prescriptions will not be filled unless you are compliant with your follow up appointments or have a follow up appointment scheduled as per instruction of your physician. Refills should be requested at the time of your visit.     PLAN:   Through informed decision making process incorporating patients unique circumstances, the following treatment plan will be initiated:    1.  Prescription drug management of cardiovascular medication for efficacy, adherence to treatment, side effect assessment and polypharmacy. Current treatment clinically warranted and to continue without modifications.    2.  Echocardiogram (edema, atrial fib/ vpaced 85%)    3. Return for follow-up; in the interim, contact the office if new symptoms arise.  Dr. Art as scheduled unless abnormal testing  I will call in 2 weeks to make sure wound is healing

## 2025-05-22 ASSESSMENT — ENCOUNTER SYMPTOMS
PALPITATIONS: 0
NEAR-SYNCOPE: 0
DYSPNEA ON EXERTION: 0
PND: 0
ORTHOPNEA: 0
SYNCOPE: 0
IRREGULAR HEARTBEAT: 0

## 2025-05-22 NOTE — PROGRESS NOTES
"Chief Complaint  \"My legs have been swelling\"    Reason for Visit  Add-on  Patient presents to the office today for outpatient follow-up for edema.  Last evaluated in clinic by Dr. Art Feb 2025.    Presents today ambulatory with steady gait.  Accompanied by spouse  Patient denies any hospitalizations or significant changes to interval medical history since last office follow-up.     History of Present Illness   Patient wife did phone into the office reporting increasing left greater than right edema.  He was seen in local emergency department where he ruled out for DVT, Keflex was added due to right lower extremity with early signs of cellulitis from an open wound.  I have then added Lasix 20 mg as needed and they have taken 2 doses with noted benefit.    Patient reports somewhat chronic left edema likely due to venous stasis changes.  He states that things have really been okay over the last 6 months but seem to have\" flared up over the last couple weeks\".  He did have an injury to his right shin with an open area that is scabbed over, there are some slight erythema but they report with Keflex treatment things are getting better.  He remains with 1+ Tolin edema on the right, that left ankle looks to have some chronic venous stasis nonpitting changes.  He denies any orthopnea, there is no PND.  He sleeps in recliner for comfort.  He does not elevate his legs during the day.  This is his busy season at the [employer] and he is working 7 days a week.  His weight in the office is up 11 pounds he was wearing his jacket, he believes this is an accurate and reports his weight is steady on his home scale.    Potassium 3.9, creatinine 0.93.    Patient reports that overall has no complaint(s) of chest pain, chest pressure/discomfort, claudication, dyspnea, exertional chest pressure/discomfort, fatigue, irregular heart beat, and near-syncope    Review of Systems   Cardiovascular:  Positive for leg swelling. Negative for " "chest pain, dyspnea on exertion, irregular heartbeat, near-syncope, orthopnea, palpitations, paroxysmal nocturnal dyspnea and syncope.        Visit Vitals  /70 (BP Location: Left arm, Patient Position: Sitting)   Pulse 56   Ht 1.676 m (5' 6\")   Wt 91.2 kg (201 lb)   BMI 32.44 kg/m²   Smoking Status Never   BSA 2.06 m²     Physical Exam  Vitals and nursing note reviewed.   Constitutional:       Appearance: Normal appearance.   Cardiovascular:      Rate and Rhythm: Normal rate and regular rhythm.      Heart sounds: Murmur heard.      Systolic murmur is present with a grade of 2/6.      Comments: Left ankle nonpitting edema chronic venous stasis  Right shin with scabbed over open area, no drainage.  Mild erythema.  1+ edema.  Currently on Keflex.  Pulmonary:      Effort: Pulmonary effort is normal.      Breath sounds: Normal breath sounds.   Musculoskeletal:      Cervical back: Full passive range of motion without pain.      Right lower leg: No edema.      Left lower leg: No edema.   Skin:     General: Skin is cool.   Neurological:      Mental Status: He is alert and oriented to person, place, and time.   Psychiatric:         Attention and Perception: Attention normal.         Mood and Affect: Mood normal.         Behavior: Behavior is cooperative.          ALLERGIES:  Hydrocodone-acetaminophen and Tramadol    Current Outpatient Medications   Medication Instructions    atorvastatin (LIPITOR) 10 mg, oral, Nightly    dabigatran etexilate (PRADAXA) 150 mg, oral, 2 times daily    furosemide (LASIX) 20 mg, oral, As needed    hydroCHLOROthiazide (MICROZIDE) 12.5 mg, oral, Daily RT    irbesartan (AVAPRO) 150 mg, oral, Nightly    meloxicam (Mobic) 15 mg tablet 1 tablet, Daily    metoprolol succinate XL (TOPROL-XL) 25 mg, oral, Daily    nitroglycerin (NITROSTAT) 0.4 mg, Every 5 min PRN    spironolactone (ALDACTONE) 12.5 mg, oral, Daily      Assessment:    79-year-old gentleman presents to the office due to concerns of " progressive bilateral lower extremity edema.  Left ankle seems to be chronic, right lower extremity edema is likely due to recent injury and open wound, improving cellulitis.  Will continue Keflex and has been encouraged to update with PCP.  He is on no offending agents.  Encouraged to keep legs elevated is much as possible.  Otherwise, we will continue Lasix on an as-needed basis.  Discussed that only concern would be with progression to chronic atrial fibrillation with high percent of ventricular pacing there could be some evidence of cardiomyopathy.  Last TTE in 2019 and at that time he was in normal sinus rhythm.  He does agree to repeat echocardiogram for surveillance and to clinic for optimization of guideline directed medical therapy of any evidence of cardiomyopathy.    From a cardiovascular history:  Remote 2009 PCI, last ischemic evaluation 2019 MPI.  Current daily activity greater than 4 METS without concerning symptoms.  2019 TTE LVEF 55 to 60%, LVH mild, left atrium moderate.  MR trace.  [employer] 2272 dual-chamber permanent pacemaker.  Chronic atrial fibrillation with January 2025 device interrogation showing V pacing 87%.  CHADS VASc 6 chronically anticoagulated Pradaxa.  Denies bleeding diatheses.    Plan:     Through informed decision making process incorporating patients unique circumstances, the following treatment plan will be initiated:    1.  Prescription drug management of cardiovascular medication for efficacy, adherence to treatment, side effect assessment and polypharmacy. Current treatment clinically warranted and to continue without modifications.    2.  Echocardiogram (edema, atrial fib/ vpaced 85%)    3. Return for follow-up; in the interim, contact the office if new symptoms arise.  Dr. Art as scheduled unless abnormal testing  I will call in 2 weeks to make sure wound is healing    Beatriz Bashir MSN, APRN-CNP, PMHNP-[facility]  [place], [place]     Please excuse any errors in grammar or translation related to this dictation. Voice recognition software was utilized to prepare this document.

## 2025-05-28 ENCOUNTER — TELEPHONE (OUTPATIENT)
Dept: CARDIOLOGY | Facility: CLINIC | Age: 80
End: 2025-05-28
Payer: MEDICARE

## 2025-05-28 NOTE — TELEPHONE ENCOUNTER
----- Message from Beatriz Bashir sent at 5/22/2025 10:20 AM EDT -----  In 2 weeks can you please call patient and check on right lower extremity wound.  When I saw him in the office the ER had recently started Keflex, I encouraged him to contact PCP.  I just want to make sure that things have healed up.

## 2025-06-19 DIAGNOSIS — R60.9 EDEMA, UNSPECIFIED TYPE: ICD-10-CM

## 2025-06-19 RX ORDER — FUROSEMIDE 20 MG/1
20 TABLET ORAL AS NEEDED
Qty: 15 TABLET | Refills: 1 | Status: SHIPPED | OUTPATIENT
Start: 2025-06-19

## 2025-07-11 DIAGNOSIS — I10 ESSENTIAL HYPERTENSION: ICD-10-CM

## 2025-07-11 RX ORDER — IRBESARTAN 150 MG/1
150 TABLET ORAL NIGHTLY
Qty: 90 TABLET | Refills: 3 | Status: SHIPPED | OUTPATIENT
Start: 2025-07-11 | End: 2026-07-11

## 2025-07-23 ENCOUNTER — HOSPITAL ENCOUNTER (OUTPATIENT)
Dept: CARDIOLOGY | Facility: CLINIC | Age: 80
Discharge: HOME | End: 2025-07-23
Payer: MEDICARE

## 2025-07-23 VITALS
BODY MASS INDEX: 32.3 KG/M2 | DIASTOLIC BLOOD PRESSURE: 78 MMHG | HEIGHT: 66 IN | WEIGHT: 201 LBS | SYSTOLIC BLOOD PRESSURE: 122 MMHG

## 2025-07-23 DIAGNOSIS — R60.0 LOCALIZED EDEMA: ICD-10-CM

## 2025-07-23 LAB
AORTIC VALVE MEAN GRADIENT: 4 MMHG
AORTIC VALVE PEAK VELOCITY: 1.24 M/S
AV PEAK GRADIENT: 6 MMHG
AVA (PEAK VEL): 2.27 CM2
AVA (VTI): 2.02 CM2
EJECTION FRACTION APICAL 4 CHAMBER: 43.3
EJECTION FRACTION: 55 %
LEFT ATRIUM VOLUME AREA LENGTH INDEX BSA: 59.8 ML/M2
LEFT VENTRICLE INTERNAL DIMENSION DIASTOLE: 4.74 CM (ref 3.5–6)
LEFT VENTRICULAR OUTFLOW TRACT DIAMETER: 2.35 CM
LV EJECTION FRACTION BIPLANE: 41 %
RIGHT VENTRICLE FREE WALL PEAK S': 14.23 CM/S
RIGHT VENTRICLE PEAK SYSTOLIC PRESSURE: 26 MMHG

## 2025-07-23 PROCEDURE — 93306 TTE W/DOPPLER COMPLETE: CPT

## 2025-07-23 PROCEDURE — 93306 TTE W/DOPPLER COMPLETE: CPT | Performed by: INTERNAL MEDICINE

## 2025-07-24 ENCOUNTER — RESULTS FOLLOW-UP (OUTPATIENT)
Dept: CARDIOLOGY | Facility: CLINIC | Age: 80
End: 2025-07-24
Payer: MEDICARE

## 2025-07-24 NOTE — TELEPHONE ENCOUNTER
----- Message from Beatriz Bashir sent at 7/24/2025 11:31 AM EDT -----  Him know heart muscle function is at 55% which is normal, valves are opening and closing okay.  Nothing really that could explain the swelling that he was having.  Please see if his edema is any   better.  ----- Message -----  From: Chase, Syngo - Cardiology Results In  Sent: 7/23/2025   5:58 PM EDT  To: COLTON Elias-CNP

## 2025-07-24 NOTE — TELEPHONE ENCOUNTER
Result Communication    Resulted Orders   Transthoracic Echo Complete   Result Value Ref Range    AV mn grad 4 mmHg    AV pk lori 1.24 m/s    LV Biplane EF 41 %    LVOT diam 2.35 cm    LA vol index A/L 59.8 ml/m2    LV EF 55 %    RV free wall pk S' 14.23 cm/s    RVSP 26 mmHg    LVIDd 4.74 cm    Aortic Valve Area by Continuity of Peak Velocity 2.27 cm2    AV pk grad 6 mmHg    Aortic Valve Area by Continuity of VTI 2.02 cm2    LV A4C EF 43.3     Narrative                   [address], [address] [place] [address]          Tel [phone] Fax [phone]    TRANSTHORACIC ECHOCARDIOGRAM REPORT    Patient Name:       MONA Medina Physician:    [id number] Gordon Clarke MD  Study Date:         7/23/2025           Ordering Provider:    [id number] ALISHA CARDONA  MRN/PID:            [id number]            Fellow:  Accession#:         [id number]        Nurse:  Date of Birth/Age:  1945 / 79      Sonographer:          Nicole puga                                     RDCS, RVT  Gender Assigned at  M                   Additional Staff:  Birth:  Height:             167.64 cm           Admit Date:  Weight:             91.17 kg            Admission Status:     Outpatient  BSA / BMI:          2.00 m2 / 32.44     Department Location:  [facility]                      kg/m2                                     [place]  Blood Pressure: 122 /78 mmHg    Study Type:    TRANSTHORACIC ECHO (TTE) COMPLETE  Diagnosis/ICD: Localized edema-R60.0  Indication:    Atrial Fibrillation, Sick Sinus Syndrome, Pacemaker, CAD, MI,                 PTCA-2009, Former Smoker, Venous Stasis  CPT Codes:     Echo Complete w Full [id number]   Study Detail: The following Echo studies were performed: 2D, M-Mode, Doppler and                color flow.        PHYSICIAN INTERPRETATION:  Left Ventricle: The left ventricular systolic function is normal with a visually estimated ejection fraction of 55%. There is mild concentric left ventricular hypertrophy. There are no regional wall motion abnormalities. The left ventricular cavity size is normal. There is mild increased septal and mildly increased posterior left ventricular wall thickness. Spectral Doppler shows a normal pattern of left ventricular diastolic filling. Mild concentric left nuclear hypertrophy.  Left Atrium: The left atrial size is moderately dilated.  Right Ventricle: The right ventricle is normal in size. There is normal right ventricular global systolic function. Right ventricular pacer lead was seen.  Right Atrium: The right atrial size is moderately dilated.  Aortic Valve: The aortic valve is trileaflet. The aortic valve area by VTI is 2.02 cmï¿½ with a peak velocity of 1.24 m/s. The peak and mean gradients are 6 mmHg and 4 mmHg, respectively, with a dimensionless index of 0.47. There is mild aortic valve cusp calcification. There is mild aortic valve regurgitation.  Mitral Valve: The mitral valve is normal in structure. The doppler estimated peak and mean diastolic gradients are 3 mmHg and 1 mmHg, respectively. There is mild mitral valve regurgitation. The E Vmax is 0.75 m/s.  Tricuspid Valve: The tricuspid valve is structurally normal. There is trace to mild tricuspid regurgitation. The Doppler estimated right ventricular systolic pressure (RVSP) is within normal limits at 26 mmHg.  Pulmonic Valve: The pulmonic valve is structurally normal. There is no indication of pulmonic valve regurgitation.  Pericardium: No pericardial effusion noted.  Aorta: The aortic root is normal.       CONCLUSIONS:   1. The left ventricular systolic function is normal with a visually estimated ejection fraction of 55%.   2. Mild concentric left nuclear hypertrophy.   3. There is normal right ventricular global systolic  function.   4. Right ventricular pacer lead was seen.   5. The left atrial size is moderately dilated.   6. The right atrial size is moderately dilated.   7. Mild mitral valve regurgitation.   8. The Doppler estimated RVSP is within normal limits at 26 mmHg.   9. Trace to mild tricuspid regurgitation.  10. Mild aortic valve regurgitation.  11. No significant changes noted when compared to previous study.  12. Patient in atrial fibrillation throughout the study.    QUANTITATIVE DATA SUMMARY:     2D MEASUREMENTS:             Normal Ranges:  Ao Root s:       3.70 cm  LAs:             4.85 cm     (2.7-4.0cm)  RVIDd:           3.50 cm     (0.9-3.6cm)  IVSd:            1.63 cm     (0.6-1.1cm)  LVPWd:           1.26 cm     (0.6-1.1cm)  LVIDd:           4.74 cm     (3.9-5.9cm)  LVIDs:           3.13 cm  LV Mass Index:   140.2 g/m2  LVEDV Index:     40.76 ml/m2  LV % FS          33.9 %       LEFT ATRIUM:                 Normal Ranges:  LA Vol A4C:       136.8 ml   (22+/-6mL/m2)  LA Vol A2C:       81.8 ml  LA Vol BP:        119.9 ml  LA Vol Index A4C: 68.3ml/m2  LA Vol Index A2C: 40.8 ml/m2  LA Vol Index BP:  59.8 ml/m2  LA Vol A4C:       131.2 ml  LA Vol A2C:       77.3 ml  LA Vol Index BSA: 52.0 ml/m2       LV SYSTOLIC FUNCTION:                       Normal Ranges:  EF-A4C View:    43 % (>=55%)  EF-A2C View:    39 %  EF-Biplane:     41 %  EF-Visual:      55 %  LV EF Reported: 55 %       LV DIASTOLIC FUNCTION:          Normal Ranges:  MV Peak E:             0.75 m/s (0.7-1.2 m/s)       MITRAL VALVE:          Normal Ranges:  MV Vmax:      0.86 m/s (<=1.3m/s)  MV peak P.9 mmHg (<5mmHg)  MV mean P.1 mmHg (<48mmHg)  MV VTI:       12.15 cm (10-13cm)  MV DT:        153 msec (150-240msec)       AORTIC VALVE:                     Normal Ranges:  AoV Vmax:                1.24 m/s (<=1.7m/s)  AoV Peak P.1 mmHg (<20mmHg)  AoV Mean PG:             3.7 mmHg (1.7-11.5mmHg)  LVOT Max Ran:            0.65 m/s  (<=1.1m/s)  AoV VTI:                 26.88 cm (18-25cm)  LVOT VTI:                12.57 cm  LVOT Diameter:           2.35 cm  (1.8-2.4cm)  AoV Area, VTI:           2.02 cm2 (2.5-5.5cm2)  AoV Area,Vmax:           2.27 cm2 (2.5-4.5cm2)  AoV Dimensionless Index: 0.47       AORTIC INSUFFICIENCY:  AI Vmax:       3.64 m/s  AI Half-time:  442 msec  AI Decel Time: 1523 msec  AI Decel Rate: 238.93 cm/s2       RIGHT VENTRICLE:  RV Basal 4.54 cm  RV Mid   3.30 cm  RV Major 6.5 cm  RV s'    0.14 m/s       TRICUSPID VALVE/RVSP:          Normal Ranges:  Peak TR Velocity:     2.41 m/s  Est. RA Pressure:     3 mmHg  RV Syst Pressure:     26 mmHg  (< 30mmHg)  IVC Diam:             2.02 cm       PULMONIC VALVE:          Normal Ranges:  RVOT Vmax:      0.76 m/s (0.6-0.9m/s)       AORTA:  Asc Ao Diam 3.93 cm       [id number] Gordon Clarke MD  Electronically signed on 7/23/2025 at 5:58:36 PM         ** Final **         12:54 PM      Results were successfully communicated with the spouse and they acknowledged their understanding.

## 2026-02-05 ENCOUNTER — APPOINTMENT (OUTPATIENT)
Dept: CARDIOLOGY | Facility: CLINIC | Age: 81
End: 2026-02-05
Payer: MEDICARE